# Patient Record
Sex: MALE | Race: WHITE | NOT HISPANIC OR LATINO | URBAN - METROPOLITAN AREA
[De-identification: names, ages, dates, MRNs, and addresses within clinical notes are randomized per-mention and may not be internally consistent; named-entity substitution may affect disease eponyms.]

---

## 2019-05-14 ENCOUNTER — OFFICE VISIT CONVERTED (OUTPATIENT)
Dept: PULMONOLOGY | Facility: CLINIC | Age: 54
End: 2019-05-14
Attending: PHYSICIAN ASSISTANT

## 2019-06-03 ENCOUNTER — HOSPITAL ENCOUNTER (OUTPATIENT)
Dept: CARDIOLOGY | Facility: HOSPITAL | Age: 54
Discharge: HOME | End: 2019-06-03
Attending: INTERNAL MEDICINE

## 2019-07-12 ENCOUNTER — HOSPITAL ENCOUNTER (OUTPATIENT)
Dept: GENERAL RADIOLOGY | Facility: HOSPITAL | Age: 54
Discharge: HOME | End: 2019-07-12
Attending: PHYSICIAN ASSISTANT

## 2019-07-30 ENCOUNTER — OFFICE VISIT CONVERTED (OUTPATIENT)
Dept: PULMONOLOGY | Facility: CLINIC | Age: 54
End: 2019-07-30
Attending: INTERNAL MEDICINE

## 2019-08-06 ENCOUNTER — HOSPITAL ENCOUNTER (OUTPATIENT)
Dept: OTHER | Facility: HOSPITAL | Age: 54
Discharge: HOME | End: 2019-08-06
Attending: INTERNAL MEDICINE

## 2019-10-18 ENCOUNTER — HOSPITAL ENCOUNTER (OUTPATIENT)
Dept: CARDIOLOGY | Facility: HOSPITAL | Age: 54
Discharge: HOME | End: 2019-10-18
Attending: INTERNAL MEDICINE

## 2019-12-17 ENCOUNTER — OFFICE VISIT CONVERTED (OUTPATIENT)
Dept: PULMONOLOGY | Facility: CLINIC | Age: 54
End: 2019-12-17
Attending: INTERNAL MEDICINE

## 2021-05-28 VITALS
HEART RATE: 89 BPM | OXYGEN SATURATION: 99 % | WEIGHT: 209.44 LBS | SYSTOLIC BLOOD PRESSURE: 132 MMHG | TEMPERATURE: 98.3 F | RESPIRATION RATE: 15 BRPM | HEIGHT: 68 IN | DIASTOLIC BLOOD PRESSURE: 80 MMHG | BODY MASS INDEX: 31.74 KG/M2

## 2021-05-28 VITALS
HEIGHT: 68 IN | SYSTOLIC BLOOD PRESSURE: 139 MMHG | BODY MASS INDEX: 31.9 KG/M2 | HEART RATE: 100 BPM | TEMPERATURE: 98.5 F | TEMPERATURE: 98.2 F | WEIGHT: 210.12 LBS | BODY MASS INDEX: 31.85 KG/M2 | HEIGHT: 68 IN | HEART RATE: 89 BPM | OXYGEN SATURATION: 98 % | WEIGHT: 210.5 LBS | RESPIRATION RATE: 12 BRPM | RESPIRATION RATE: 12 BRPM | OXYGEN SATURATION: 97 % | DIASTOLIC BLOOD PRESSURE: 71 MMHG | DIASTOLIC BLOOD PRESSURE: 60 MMHG | SYSTOLIC BLOOD PRESSURE: 106 MMHG

## 2021-05-28 NOTE — PROGRESS NOTES
Patient: SAIGE FERNANDO     Acct: [id number]     Report: #[id number]  UNIT #: [id number]     : 1965    Encounter Date:2019  PRIMARY CARE:   ***Signed***  --------------------------------------------------------------------------------------------------------------------  Chief Complaint      Encounter Date      Dec 17, 2019            Primary Care Provider      SACHIN MIRELES [place]            Referring Provider            [facility]            Patient Complaint      Patient is complaining of      4 month follow up/soa            VITALS      Height 5 ft 8 in / 172.72 cm      Weight 210 lbs 2 oz / 95.410881 kg      BSA 2.09 m2      BMI 31.9 kg/m2      Temperature 98.5 F / 36.94 C - Oral      Pulse 89      Respirations 12      Blood Pressure 139/60 Sitting, Right Arm      Pulse Oximetry 97%, roomair      Initial Exhaled Nitrous Oxide      Date:  2019      Exhaled Nitrous Oxide Results:  6            HPI      The patient is a very pleasant 54 year old  male former cigarettes     smoker with chronic obstructive pulmonary disease here for follow up today.             Since his last office visit he had pancytopenia and has been diagnosed with MDS.    He is being evaluated by [facility] and will likely undergo bone marrow transplant    sometime next year. He has finished pulmonary rehab. He wears 2 liters of     oxygen. He is doing well on his current nebulizer and inhaler regimen. He gets     short of breath walking 800-900 feet, moderate in severity, worse with exertion,    relieved with rest. He denies any coughing, wheezing, headaches and hemoptysis     or chest pain. He is able to perform his ADL's without difficulty and denies any    swollen glands in his lymph nodes, head or neck. He has not had a cigarette in 8    months.             I personally reviewed Review of Systems, family, social, surgical and medical     history and agree with their findings.             ROS      Constitutional:  Denies: Fatigue, Fever, Weight gain, Weight loss, Chills,     Insomnia, Other      Respiratory/Breathing:  Denies: Shortness of air, Wheezing, Cough, Hemoptysis,     Pleuritic pain, Other      Endocrine:  Denies: Polydipsia, Polyuria, Heat/cold intolerance, Diabetes, Other      Eyes:  Denies: Blurred vision, Vision Changes, Other      Ears, nose, mouth, throat:  Denies: Mouth lesions, Thrush, Throat pain,     Hoarseness, Allergies/Hay Fever, Post Nasal Drip, Headaches, Recent Head Injury,    Nose Bleeding, Neck Stiffness, Thyroid Mass, Hearing Loss, Ear Fullness, Dry     Mouth, Nasal or Sinus Pain, Dry Lips, Nasal discharge, Nasal congestion, Other      Cardiovascular:  Denies: Palpitations, Syncope, Claudication, Chest Pain, Wake     up Gasping for air, Leg Swelling, Irregular Heart Rate, Cyanosis, Dyspnea on     Exertion, Other      Gastrointestinal:  Denies: Nausea, Constipation, Diarrhea, Abdominal pain,     Vomiting, Difficulty Swallowing, Reflux/Heartburn, Dysphagia, Jaundice,     Bloating, Melena, Bloody stools, Other      Genitourinary:  Denies: Urinary frequency, Incontinence, Hematuria, Urgency,     Nocturia, Dysuria, Testicular problems, Other      Musculoskeletal:  Denies: Joint Pain, Joint Stiffness, Joint Swelling, Myalgias,    Other      Hematologic/lymphatic:  DENIES: Lymphadenopathy, Bruising, Bleeding tendencies,     Other      Neurological:  Denies: Headache, Numbness, Weakness, Seizures, Other      Psychiatric:  Denies: Anxiety, Appropriate Effect, Depression, Other      Sleep:  No: Excessive daytime sleep, Morning Headache?, Snoring, Insomnia?, Stop    breathing at sleep?, Other      Integumentary:  Denies: Rash, Dry skin, Skin Warm to Touch, Other      Immunologic/Allergic:  Denies: Latex allergy, Seasonal allergies, Asthma,     Urticaria, Eczema, Other      Immunization status:  No: Up to date            FAMILY/SOCIAL/MEDICAL HX      Surgical History:  Yes:  Cholecystectomy; No: AAA Repair, Abdominal Surgery,     Adenoids, Angioplasty, Appendectomy, Back Surgery, Bladder Surgery, Bowel     Surgery, Breast Surgery, CABG, Carotid Stenosis, Ear Surgery, Eye Surgery, Head     Surgery, Hernia Surgery, Kidney Surgery, Nose Surgery, Oral Surgery, Orthopedic     Surgery, Prostatectomy, Rectal Surgery, Spinal Surgery, Testicular Surgery,     Throat Surgery, Tonsils, Valve Replacement, Vascular Surgery, Other Surgeries      Other Family Medical History:  Father      Is Father Still Living?:  No      Is Mother Still Living?:  No       Family History:  Yes      Social History:  No Tobacco Use, No Alcohol Use, No Recreational Drug use      Smoking status:  Former smoker (smoker x40 years, quit in 4/2019)      Anticoagulation Therapy:  No      Antibiotic Prophylaxis:  No      Medical History:  Yes: Chronic Bronchitis/COPD, Congestive Heart Failu (newly     diagnosed), High Blood Pressure, Shortness Of Breath; No: Alcoholism, Allergies,    Anemia, Arthritis, Asthma, Atrial Fibrillation, Blood Disease, Broken Bones,     Cataracts, Chemical Dependency, Chemotherapy/Cancer, Emphysema, Chronic Liver     Disease, Colon Trouble, Colitis, Diverticulitis, Deafness or Ringing Ears,     Convulsions, Depression, Anxiety, Bipolar Disorder, PTSD, Diabetes, Epilepsy,     Seizures, Forgetfullness, Glaucoma, Gall Stones, Gout, Head Injury, Heart     Attack, Heart Murmur, GERD, Hemorrhoids/Rectal Prob, Hepatitis, Hiatal Hernia,     High Cholesterol, HIV (Do not ask - volu, Jaundice, Kidney or Bladder Disease,     Kidney Stones, Migrane Headaches, Mitral Valve Prolapse, Night sweats,     Phlebitis, Psychiatric Care, Reflux Disease, Rheumatic Fever, Sexually     Transmitted Dis, Sinus Trouble, Skin Disease/Psoriais/Ecz, Stroke, Thyroid     Problem, Tuberculosis or Pos TB Te, Miscellaneous Medical/oth      Psychiatric History      none            PREVENTION      Hx Influenza Vaccination:  No       Influenza Vaccine Declined:  Yes      2 or More Falls Past Year?:  No      Fall Past Year with Injury?:  No      Hx Pneumococcal Vaccination:  No      Encouraged to follow-up with:  PCP regarding preventative exams.      Chart initiated by      pau kirby ma            ALLERGIES/MEDICATIONS      Allergies:        Coded Allergies:             NO KNOWN ALLERGIES (Unverified , 12/17/19)      Medications    Last Reconciled on 12/17/19 16:27 by LAWRENCE BAUMANN MD      Tiotropium Br/Olodaterol HCl (Stiolto Respimat Inhal Spray) 4 Gm Mist.inhal               Prov: LAWRENCE BAUMANN         12/17/19       Promethazine Hcl (Phenergan*) 25 Mg Tablet      25 MG PO Q4H PRN for NAUSEA, TAB 0 Refills         Reported         12/17/19       Ondansetron Odt (ONDANSETRON ODT) 8 Mg Tab.rapdis      8 MG PO BID, TAB.RAPDIS 0 Refills         Reported         12/17/19       Lisinopril* (Lisinopril*) 20 Mg Tablet      20 MG PO QDAY, #30 TAB 0 Refills         Reported         12/17/19       Nicotine Polacrilex (Nicorette) 4 Mg Lozng.mini      4 MG PO Q2HR, TAB         Reported         12/17/19       Tiotropium Br/Olodaterol HCl (Stiolto Respimat Inhal Spray) 4 Gm Mist.inhal               Prov: Christine Delarosa PA-C         5/14/19       Tiotropium Br/Olodaterol HCl (Stiolto Respimat Inhal Spray) 4 Gm Mist.inhal      2 PUFFS INH RTQDAY, #1 INH 5 Refills         Prov: Christine Delarosa PA-C         5/14/19       (potassium)   No Conflict Check               Reported         4/27/19       MDI-Albuterol (Proair HFA) 8.5 Gm Hfa.aer.ad      2 PUFFS INH Q4H PRN for SHORTNESS OF BREATH, #1 MDI 0 Refills         Reported         4/27/19      Current Medications      Current Medications Reviewed 12/17/19            EXAM      Vital Signs Reviewed      Gen: WDWN, Alert, NAD.        HEENT:  PERRL, EOMI.  OP, nares clear, no sinus tenderness.      Chest: Poor aeration, barrel chested, trace bibasilar rhonchi, tympanic to     percussion bilaterally, no  work of breathing noted.      CV:  RRR, no MGR, pulses 2+, equal.      Abd:  Soft, NT, ND, + BS, no HSM.      EXT:  No clubbing, no cyanosis, no edema,.       Neuro:  A  Skin: No rashes or lesions.      Vtials      Vitals:             Height 5 ft 8 in / 172.72 cm           Weight 210 lbs 2 oz / 95.989683 kg           BSA 2.09 m2           BMI 31.9 kg/m2           Temperature 98.5 F / 36.94 C - Oral           Pulse 89           Respirations 12           Blood Pressure 139/60 Sitting, Right Arm           Pulse Oximetry 97%, roomair            REVIEW      Results Reviewed      PCCS Results Reviewed?:  Yes Prev Lab Results, Yes Prev Radiology Results, Yes     Previous Mec[facility] Records      Lab Results      I personally reviewed notes from Dr. Cornell Phipps at [facility] oncology. I     reviewed labs from May 2019 showing thrombocytopenia and evidence of chronic     hypercapneic respiratory failure.      Radiographic Results               [facility] Diagnostic Img                PACS RADIOLOGY REPORT            Patient: SAIGE FERNANDO   Acct: #[id number]   Report: #[id number]            UNIT #: [id number]    DOS: 19 1230      INSURANCE:[employer] - [facility]   ORDER #:[id number]      LOCATION:[facility]     : 1965            PROVIDERS      ADMITTING:     ATTENDING: Christine Delarosa PA-C      FAMILY:  EUNICEBRITNEY   ORDERING:  Christine Delarosa PA-C         OTHER:    DICTATING:  Cuong Goyal MD            REQ #:[id number]   [facility]      REASON FOR EXAM:        REASON FOR VISIT:  COPD            *******Signed******         PROCEDURE:   CT CHEST WITHOUT CONTRAST             COMPARISON:   [facility], CT, CHEST W/O CONTRAST, 2019, 20:5    1.             INDICATIONS:   ORDER STATES COPD, SOA, PATIENT STATES HX OF HISTOPLASMOSIS             TECHNIQUE:   CT images were created without the administration of contrast      material.               PROTOCOL:     Standard imaging protocol performed                RADIATION:     DLP: 516.2mGy*cm          Automated exposure control was utilized to minimize radiation dose.              FINDINGS:         There is vascular calcification including some Coronary artery calcification     more marked in the       area of the proximal right coronary artery.  No pathologic appearing mediastinal     lymphadenopathy is       confirmed.             On evaluation of lung windows there is some bibasilar atelectasis.  There are no     pleural effusions.             There is calcification underlying left hemidiaphragm that may relate to fat     necrosis.  It looks       like there is some hepatic steatosis.             There are some underlying degenerative changes involving the thoracic spine.             There is suggested gynecomastia.             CONCLUSION:         1. Bibasilar atelectasis.      2. Vascular calcification including coronary artery calcification.  A     cardiovascular assessment       could be obtained if not already carried out to reassess this patient.      3. Gynecomastia      4. Probable hepatic steatosis              HAYDE WEINBERG MD             Electronically Signed and Approved By: HAYDE WEINBERG MD on 2019 at 13:58                        Until signed, this is an unconfirmed preliminary report that may contain      errors and is subject to change.                                              KAMCR:      D:19 [id number]      PFT Results      Patient: SAIGE FERNANDO   Acct: #[id number]   Report: #[id number]            UNIT #: [id number]    DOS:       LOCATION:[facility]     : 1965            PROVIDERS      ADMITTING:     FAMILY:  WAGNER THALIA         OTHER:       DICTATING:  LAWRENCE BAUMANN MD            REASON FOR VISIT:  COPD/RESPITORY FAILURE            *******Signed******                                    [facility]  [employer]                            [place]  [phone]               __________________________________________________________________________             Patient Name:                   Attending Physician:      Alton Bolton M.D.             Patient Visit # MR #            Admit Date  Disch Date     Location      [id number]    [id number]      10/18/2019                 [facility]             Date of Birth      1965      __________________________________________________________________________      0821 - DIAGNOSTIC REPORT             PULMONARY FUNCTION TEST             DATE OF TEST:      10/18/19.             SPIROMETRY:      Severe airflow obstruction noted.      FEV1 1.74 liters/49% predicted.      No bronchodilator response noted.      Flow volume loop shows obstruction.             LUNG VOLUMES:      Both hyperinflation and air trapping present.             DIFFUSION CAPACITY:      Diffusion capacity is severely reduced at 35% predicted.             OVERALL IMPRESSION:      Severe airflow obstruction with no bronchodilator response noted.  Both      hyperinflation and air trapping present.  Diffusion capacity severely      reduced.  Compared to PFT's done in June 2019, there has been a significant      improvement in his FEV1 from 1.22 liters to 1.74 liters, 42% improvement;      significant improvement in FVC from 2.68 liters to 3.32 liters, 23%      improvement.  There has also been a 29% improvement in his diffusion capacity      from 7.61 to 9.89.  Overall, he has had a marked improvement in all aspects      of pulmonary function.             To be electronically signed in [place] [employer] [place]      [id number] LAWRENCE BAUMANN M.D.             AM:porfirio      D:  10/23/2019 12:07      T:  10/23/2019 13:00      #[id number]             Until signed, this is an unconfirmed preliminary report that may contain      errors and is subject to change.                    Until signed, this is an unconfirmed preliminary report that may contain      errors and is subject to change.                     <Electronically signed by LAWRENCE BAUMANN MD>                10/24/19 [id number]               LAWRENCE BAUMANN MD:TIGREM      D:10/23/19 4154            Assessment      Notes      New Medications      * Nicotine Polacrilex (Nicorette) 4 MG LOZNG.MINI: 4 MG PO Q2HR      * Lisinopril* 20 MG TABLET: 20 MG PO QDAY #30      * ONDANSETRON ODT 8 MG TAB.RAPDIS: 8 MG PO BID      * Promethazine Hcl (Phenergan*) 25 MG TABLET: 25 MG PO Q4H PRN NAUSEA      * Tiotropium Br/Olodaterol HCl (Stiolto Respimat Inhal Spray) 4 GM MIST.INHAL          Sample - Qty 2      Discontinued Medications      * Nicotine 21 Mg Patch 1 EACH PATCH.TD24: 21 MG TRANSDERM QDAY #1      IMPRESSION:      1. Very severe chronic obstructive pulmonary disease GOLD stage C with FEV1 32%     predicted. He is on Stiolto and chronic obstructive pulmonary disease assessment     test score is 8. He completed pulmonary rehab.       2.  Chronic dyspnea.      3. Tobacco abuse of cigarettes in remission.       4. Obesity with BMI 31.9.      5. Chronic hypoxic and hypercapneic respiratory failure.       6. Myelodysplasic syndrome.       7. Thrombocytopenia.             PLAN:       1. Continue Stiolto with albuterol as needed.       2. Continue trilogy machine nightly and with naps.       3. Continue 2 liters of oxygen during the day.       4. I applauded the patient for going to pulmonary rehab. Continue exercising.       5. I applauded the patient for continued smoking cessation.       6. He is up to date with flu vaccine and Prevnar, Pneumovax will be due as early     as July 2020.      7. It is okay from my perspective for oncology to do bone marrow transplant in     [place] next year.       8. Follow up with me in 4-6 months.            Patient Education      Patient  Education Provided:  COPD            Electronically signed by LAWRENCE BAMUANN  12/30/2019 08:03       Disclaimer: Converted document may not contain table formatting or lab diagrams. Please see [facility] for the authenticated document.

## 2021-05-28 NOTE — PROGRESS NOTES
Patient: SAIGE FERNANDO     Acct: [id number]     Report: #[id number]  UNIT #: [id number]     : 1965    Encounter Date:2019  PRIMARY CARE:   ***Signed***  --------------------------------------------------------------------------------------------------------------------  Chief Complaint      Encounter Date      May 14, 2019            Primary Care Provider      SACHIN MIRELES [place]            Referring Provider            [facility]            Patient Complaint      Patient is complaining of      New patient here today for [facility] follow up            VITALS      Height 5 ft 8 in / 172.72 cm      Weight 209 lbs 7 oz / 94.783025 kg      BSA 2.08 m2      BMI 31.8 kg/m2      Temperature 98.3 F / 36.83 C - Oral      Pulse 89      Respirations 15      Blood Pressure 132/80 Sitting, Left Arm      Pulse Oximetry 99%, nasal canula , 2 lpm            HPI      The patient is a very pleasant 54 year old white male recently seen by Dr. Goncalves, Dr. Espitia and Dr. Shaikh while hospitalized at [facility]. He was intubated and transferred from [facility] with a     chronic obstructive pulmonary disease exacerbation and also found ot have E.     Coli pneumonia. He had acute on chronic hypoxic and hypercapneic respiratory     failure, was extubated to BiPAP and was then set up for home trilogy. He has     also required home supplemental oxygen. The patient states he has been diagnosed    with chronic obstructive pulmonary disease for some time but is not sure of the     severity and has not had recent pulmonary function tests. He is maintained on     Stiolto which he feels is helping him significantly and says he has not needed     to use his albuterol rescue inhaler since he was discharged home. He denies any     coughing or wheezing, hemoptysis, fever or chills. His main complaint is that he    is more fatigued and does not have his strength or energy back yet  after his     recent hospitalization. He quit smoking during his hospitalization but is now     off of cigarettes altogether for about 3 weeks.             I reviewed her Review of Systems, medical, surgical and family history and agree    with those as entered.      Copies To:   LESLI MASON ;            ROZ      Constitutional:  Complains of: Fatigue; Denies: Fever, Weight gain, Weight loss,    Chills, Insomnia, Other      Respiratory/Breathing:  Denies: Shortness of air, Wheezing, Cough, Hemoptysis,     Pleuritic pain, Other      Endocrine:  Denies: Polydipsia, Polyuria, Heat/cold intolerance, Diabetes, Other      Eyes:  Denies: Blurred vision, Vision Changes, Other      Ears, nose, mouth, throat:  Denies: Mouth lesions, Thrush, Throat pain,     Hoarseness, Allergies/Hay Fever, Post Nasal Drip, Headaches, Recent Head Injury,    Nose Bleeding, Neck Stiffness, Thyroid Mass, Hearing Loss, Ear Fullness, Dry     Mouth, Nasal or Sinus Pain, Dry Lips, Nasal discharge, Nasal congestion, Other      Cardiovascular:  Denies: Palpitations, Syncope, Claudication, Chest Pain, Wake     up Gasping for air, Leg Swelling, Irregular Heart Rate, Cyanosis, Dyspnea on     Exertion, Other      Gastrointestinal:  Denies: Nausea, Constipation, Diarrhea, Abdominal pain,     Vomiting, Difficulty Swallowing, Reflux/Heartburn, Dysphagia, Jaundice,     Bloating, Melena, Bloody stools, Other      Genitourinary:  Denies: Urinary frequency, Incontinence, Hematuria, Urgency,     Nocturia, Dysuria, Testicular problems, Other      Musculoskeletal:  Denies: Joint Pain, Joint Stiffness, Joint Swelling, Myalgias,    Other      Hematologic/lymphatic:  DENIES: Lymphadenopathy, Bruising, Bleeding tendencies,     Other      Neurological:  Denies: Headache, Numbness, Weakness, Seizures, Other      Psychiatric:  Denies: Anxiety, Appropriate Effect, Depression, Other      Sleep:  No: Excessive daytime sleep, Morning Headache?, Snoring, Insomnia?, Stop     breathing at sleep?, Other      Integumentary:  Denies: Rash, Dry skin, Skin Warm to Touch, Other      Immunologic/Allergic:  Denies: Latex allergy, Seasonal allergies, Asthma,     Urticaria, Eczema, Other      Immunization status:  No: Up to date            FAMILY/SOCIAL/MEDICAL HX      Surgical History:  Yes: Cholecystectomy      Other Family Medical History:  Father      Is Father Still Living?:  No      Is Mother Still Living?:  No      Social History:  Tobacco Use      Smoking status:  Former smoker (smoker x40 years, quit in 4/2019)      Anticoagulation Therapy:  No      Antibiotic Prophylaxis:  No      Medical History:  Yes: Chronic Bronchitis/COPD, Congestive Heart Failu (newly     diagnosed), High Blood Pressure, Shortness Of Breath; No: Arthritis, Blood     Disease, Chemotherapy/Cancer, Deafness or Ringing Ears, Hemorrhoids/Rectal Prob,    Miscellaneous Medical/oth      Psychiatric History      none            PREVENTION      Hx Influenza Vaccination:  No      Influenza Vaccine Declined:  Yes      2 or More Falls Past Year?:  No      Fall Past Year with Injury?:  No      Hx Pneumococcal Vaccination:  No      Encouraged to follow-up with:  PCP regarding preventative exams.      Chart initiated by      Matilde Dang CMA            ALLERGIES/MEDICATIONS      Allergies:        Coded Allergies:             NO KNOWN ALLERGIES (Unverified , 5/14/19)      Medications    Last Reconciled on 5/14/19 11:33 by GIRISH BETTENCOURT      Tiotropium Br/Olodaterol HCl (Stiolto Respimat Inhal Spray) 4 Gm Mist.inhal      2 PUFFS INH RTQDAY, #1 INH 5 Refills         Prov: Christine Delarosa PA-C         5/14/19       Aspirin Chew (Aspirin Baby) 81 Mg Tab.chew      81 MG PO QDAY, #30 TAB.CHEW 0 Refills         Reported         5/14/19       Nicotine 21 Mg Patch (Nicotine 21 Mg Patch) 1 Each Patch.td24      21 MG TRANSDERM QDAY, #1 BOX         Prov: Sravani Moore         5/2/19       (potassium)   No Conflict Check                Reported         4/27/19       MDI-Albuterol (Proair HFA) 8.5 Gm Hfa.aer.ad      2 PUFFS INH Q4H PRN for SHORTNESS OF BREATH, #1 MDI 0 Refills         Reported         4/27/19       Lisinopril* (Lisinopril*) 40 Mg Tablet      40 MG PO QDAY, #30 TAB 0 Refills         Reported         4/27/19      Current Medications      Current Medications Reviewed 5/14/19            EXAM      VITAL SIGNS:  Reviewed.        NECK:  Supple without tracheal deviation or lymphadenopathy.  No thyromegaly     appreciated.      LYMPHATICS:  No cervical or supraclavicular lymphadenopathy.      HEENT: Pupils are equal, round and reactive to light. There is no scleral     icterus.  Nares patent without hypertrophy of the turbinates. No erythema of the     passages.  TMs are clear bilaterally with good cone of light. The posterior     pharynx is without  lesions or erythema.      RESPIRATORY: Mildly decreased breath sounds throughout, no wheezes, rhonchi or     crackles, normal work of breathing noted.        CARDIOVASCULAR:  Regular rate and rhythm.  No murmurs, gallops or rubs.  No     lower extremity edema.  Equal radial pulses.        GI: Soft, nontender, nondistended, no organomegaly.  Bowel sounds present in all     four quadrants.      MUSCULOSKELETAL:  No joint effusions, erythema or warmth over the major joint     systems.      SKIN:  No rashes or lesions.      NEUROLOGIC: Cranial nerves II-XII are intact bilaterally.  Moves all     extremities. Ambulates with ease.      PSYCH:  Appropriate mood and affect.      Vtials      Vitals:             Height 5 ft 8 in / 172.72 cm           Weight 209 lbs 7 oz / 94.358434 kg           BSA 2.08 m2           BMI 31.8 kg/m2           Temperature 98.3 F / 36.83 C - Oral           Pulse 89           Respirations 15           Blood Pressure 132/80 Sitting, Left Arm           Pulse Oximetry 99%, nasal canula , 2 lpm            REVIEW      Results Reviewed      PCCS Results Reviewed?:  Yes Prev Lab  Results, Yes Prev Radiology Results, Yes     Previous [facility] Records (I personally reviewed the patient's most recent     pulmonary consultation, progress notes and discharge summary.)      Radiographic Results               [facility]                PACS RADIOLOGY REPORT            Patient: SAIGE FERNANDO   Acct: #[id number]   Report: #[id number]            UNIT #: [id number]    DOS: 19      INSURANCE:[facility] ACCESS [facility]WORK - [facility]   ORDER #:[id number]      LOCATION:ICU  2   : 1965            PROVIDERS      ADMITTING:  Slim Kunz   ATTENDING: Slim Kunz      FAMILY:  NONE,MD   ORDERING:  Kathy Lee         OTHER:    DICTATING:  Shiv Flores MD, JR            REQ #:[id number]   EXAM:CHWO - CT CHEST without CONTRAST      REASON FOR EXAM:  acute hypoxemic respiratory failure      REASON FOR VISIT:  ACUTE COPD,ACUTE HYPOXIC RESPIRATORY FAILURE            *******Signed******         PROCEDURE:   CT CHEST WITHOUT CONTRAST             COMPARISON:   [facility], CR, CHEST AP/PA 1 VIEW, 2019,     19:05.  Other, CR, CXR       PORTABLE, 2019, 1:44.             INDICATIONS:   ACUTE HYPOXEMIC RESPIRATORY FAILURE.             TECHNIQUE:   CT images were created without the administration of contrast     material.               PROTOCOL:     Standard imaging protocol performed                RADIATION:     DLP: 655.57 mGy*cm          Automated exposure control was utilized to minimize radiation dose.              FINDINGS:   Motion artifact obscures detail on the study.  Mild subsegmental     atelectasis is seen       bilaterally, especially in the lung bases and in the dependent portions of the     lungs.  Probably no       acute infiltrate is identified.  No definite chronic interstitial lung disease     is seen.  Minimal       centrilobular and paraseptal emphysematous changes are seen, especially involvin    g  the lung apices.        No cardiac enlargement.  Calcified atherosclerotic change is present, including     involvement of the       coronary arteries.  There are suspected old healed left-sided rib fractures.      There is probable       chronic asymmetric elevation of the left diaphragm.  Two focal eggshell     calcifications are seen in       the subphrenic region and are nonspecific.  They may be related to old trauma.      They measure about       1.5 cm in greatest diameter.  They are not associated with the splenic arteries.      The patient has       undergone cholecystectomy.  There is mild lateral curvature of the thoracolumbar     spine with the       convexity to the right, which may be fixed or positional.  No enlarged     mediastinal lymph nodes are       suggested.  No pleural or pericardial effusion is seen.  Atrophy involving the     left kidney cannot       be excluded.  There is bilateral gynecomastia.  Degenerative changes involve the     imaged spine.  No       pneumothorax is seen.  No definite suspicious pulmonary nodules are appreciated.      However, the       motion artifact and suspected atelectasis may obscure such findings.             CONCLUSION:   Probably no acute infiltrate is seen.  The study is limited by     motion artifact.               COOKIE MACDONALD JR, MD             Electronically Signed and Approved By: COOKIE MACDONALD JR, MD on 4/27/2019 at     21:26                        Until signed, this is an unconfirmed preliminary report that may contain      errors and is subject to change.                                              CARJI:      D:04/27/19 2126            Assessment      COPD (chronic obstructive pulmonary disease) - J44.9            Notes      New Medications      * Aspirin Chew (Aspirin Baby) 81 MG TAB.CHEW: 81 MG PO QDAY #30      * Tiotropium Br/Olodaterol HCl (Stiolto Respimat Inhal [place]) 4 GM MIST.INHAL: 2       PUFFS INH RTQDAY #1         Dx: COPD (chronic  obstructive pulmonary disease) - J44.9      * Tiotropium Br/Olodaterol HCl (Stiolto Respimat Inhal Spray) 4 GM MIST.INHAL          Sample - Qty 2      Discontinued Medications      * predniSONE* 20 MG TABLET: 40 MG PO QDAY 1 Day #2      * CEFDINIR 300 MG CAP: 300 MG PO BID 1 Day #2      New Diagnostics      * Chest W/O Cont CT, 2 Months         Dx: COPD (chronic obstructive pulmonary disease) - J44.9      ASSESSMENT:      1. Chronic obstructive pulmonary disease severity currently unknown with recent     acute exacerbation.       2. Acute on chronic hypoxic and hypercapneic respiratory failure requiring     mechanical ventilation resolving.       3. Recent E. Coli pneumonia resolving clinically.       4. Longstanding tobacco abuse of cigarettes in remission for 3 weeks.       5. Dyspnea.       6. Obesity with BMI 31.8.            PLAN:      1. At this time I have discussed with the patient the importance of wearing his     supplemental oxygen to keep oxygen saturation above 90% and using his trilogy     machine every night. He did receive this and initially needed to have his     settings adjusted but now that the pressures and alarms have been adjusted, he     is tolerating it very well. I have instructed him to continue using this every     night with sleep, with naps and as needed during the day.       2. Continue Stiolto 2 puffs once daily and albuterol as needed. I have given him     samples of Stiolto at his request today.       3. He is scheduled for outpatient pulmonary function tests in 3 weeks and he     should get those done as ordered. We will review these at his next visit.       4. After discussing with the patient about alpha 1 antitrypsin testing,  he     states his primary care provider in [place] just recently had those done in     her office. We will request the results be sent to us       5. I will repeat a CT scan of the chest in 2 months to ensure resolution of his     E. Coli pneumonia.        6. I have congratulated him on recently quitting smoking. Smoking cessation was     discussed for 3 minutes today. I offered him medication therapy or nicotine     replacement therapy which he declines as he had nicotine lozenges at home that     he feels are helping him.       7. Follow up in 2 months with Dr. Goncalves or Dr. Espitia to discuss test results     and see how he is doing, sooner if needed.            Patient Education      Tobacco Cessation Counseling:  for 3 - 10 minutes      Patient Education Provided:  COPD, How to use an Inhaler, Smoking Cessation      Time Spent:  > 50% /[facility]            Patient Education:        Chronic Obstructive Pulmonary Disease                 Disclaimer: Converted document may not contain table formatting or lab diagrams. Please see [employer] for the authenticated document.

## 2021-05-28 NOTE — PROGRESS NOTES
Patient: SAIGE FERNANDO     Acct: [id number]     Report: #[id number]  UNIT #: [id number]     : 1965    Encounter Date:2019  PRIMARY CARE:   ***Signed***  --------------------------------------------------------------------------------------------------------------------  Chief Complaint      Encounter Date      2019            Primary Care Provider      SACHIN MIRELES [place]            Referring Provider            [facility]            Patient Complaint      Patient is complaining of      f/u atelectasis            VITALS      Height 5 ft 8.00 in / 172.72 cm      Weight 210 lbs 8.000 oz / 95.4812 kg      BSA 2.09 m2      BMI 32.0 kg/m2      Temperature 98.2 F / 36.78 C - Oral      Pulse 100      Respirations 12      Blood Pressure 106/71 Sitting, Right Arm      Pulse Oximetry 98%, roomair      Initial Exhaled Nitrous Oxide      Date:  2019      Exhaled Nitrous Oxide Results:  6            HPI      The patient is a very pleasant 54 year old  male former cigarettes     smoker with chronic obstructive pulmonary disease here for follow up.             Since his last office visit CT scan of the chest shows severe emphysema with     resolution of pneumonia. Pulmonary function tests were done showing FEV1 of 32%     predicted consistent with very severe chronic obstructive pulmonary disease. He     has a trilogy machine that he wears nightly and he is on Stiolto. He gets very     breathless after doing about 2 minutes of activity. He has issues doing things     around the house like walking around the house or going outside in his garden.     He has occasional cough that is dry with no sputum production or hemoptysis. He     denies any wheezing, chest pain, nausea or vomiting, fever or chills or     headaches.  He wears 2 liters of oxygen at night. During the day he does     desaturates but stops and rests and he bounces back up.  He has not had a      cigarette for about 3 months.   He is able to perform his ADL's without     difficulty and denies any swollen glands in his lymph nodes, head or neck.            I personally reviewed Review of Systems, family, social, surgical and medical hi    story and agree with their findings.            ROS      Constitutional:  Denies: Fatigue, Fever, Weight gain, Weight loss, Chills,     Insomnia, Other      Respiratory/Breathing:  Denies: Shortness of air, Wheezing, Cough, Hemoptysis,     Pleuritic pain, Other      Endocrine:  Denies: Polydipsia, Polyuria, Heat/cold intolerance, Diabetes, Other      Eyes:  Denies: Blurred vision, Vision Changes, Other      Ears, nose, mouth, throat:  Denies: Mouth lesions, Thrush, Throat pain,     Hoarseness, Allergies/Hay Fever, Post Nasal Drip, Headaches, Recent Head Injury,    Nose Bleeding, Neck Stiffness, Thyroid Mass, Hearing Loss, Ear Fullness, Dry     Mouth, Nasal or Sinus Pain, Dry Lips, Nasal discharge, Nasal congestion, Other      Cardiovascular:  Denies: Palpitations, Syncope, Claudication, Chest Pain, Wake     up Gasping for air, Leg Swelling, Irregular Heart Rate, Cyanosis, Dyspnea on     Exertion, Other      Gastrointestinal:  Denies: Nausea, Constipation, Diarrhea, Abdominal pain,     Vomiting, Difficulty Swallowing, Reflux/Heartburn, Dysphagia, Jaundice,     Bloating, Melena, Bloody stools, Other      Genitourinary:  Denies: Urinary frequency, Incontinence, Hematuria, Urgency,     Nocturia, Dysuria, Testicular problems, Other      Musculoskeletal:  Denies: Joint Pain, Joint Stiffness, Joint Swelling, Myalgias,    Other      Hematologic/lymphatic:  DENIES: Lymphadenopathy, Bruising, Bleeding tendencies,     Other      Neurological:  Denies: Headache, Numbness, Weakness, Seizures, Other      Psychiatric:  Denies: Anxiety, Appropriate Effect, Depression, Other      Sleep:  No: Excessive daytime sleep, Morning Headache?, Snoring, Insomnia?, Stop    breathing at sleep?, Other       Integumentary:  Denies: Rash, Dry skin, Skin Warm to Touch, Other      Immunologic/Allergic:  Denies: Latex allergy, Seasonal allergies, Asthma, Urti    caria, Eczema, Other      Immunization status:  No: Up to date            FAMILY/SOCIAL/MEDICAL HX      Surgical History:  Yes: Cholecystectomy      Other Family Medical History:  Father      Is Father Still Living?:  No      Is Mother Still Living?:  No       Family History:  Yes      Social History:  No Tobacco Use, No Alcohol Use, No Recreational Drug use      Smoking status:  Former smoker (smoker x40 years, quit in 4/2019)      Anticoagulation Therapy:  No      Antibiotic Prophylaxis:  No      Medical History:  Yes: Chronic Bronchitis/COPD, Congestive Heart Failu (newly     diagnosed), High Blood Pressure, Shortness Of Breath; No: Arthritis, Blood     Disease, Chemotherapy/Cancer, Deafness or Ringing Ears, Hemorrhoids/Rectal Prob,    Sinus Trouble, Miscellaneous Medical/oth      Psychiatric History      none            PREVENTION      Hx Influenza Vaccination:  No      Influenza Vaccine Declined:  Yes      2 or More Falls Past Year?:  No      Fall Past Year with Injury?:  No      Hx Pneumococcal Vaccination:  No      Encouraged to follow-up with:  PCP regarding preventative exams.      Chart initiated by      flex/ ma            ALLERGIES/MEDICATIONS      Allergies:        Coded Allergies:             NO KNOWN ALLERGIES (Unverified , 7/30/19)      Medications    Last Reconciled on 7/30/19 14:01 by LAWRENCE BAUMANN MD      Tiotropium Br/Olodaterol HCl (Stiolto Respimat Inhal Spray) 4 Gm Mist.inhal               Prov: Christine Delarosa PA-C         5/14/19       Tiotropium Br/Olodaterol HCl (Stiolto Respimat Inhal Spray) 4 Gm Mist.inhal      2 PUFFS INH RTQDAY, #1 INH 5 Refills         Prov: Christine Delarosa PA-C         5/14/19       Aspirin Chew (Aspirin Baby) 81 Mg Tab.chew      81 MG PO QDAY, #30 TAB.CHEW 0 Refills         Reported         5/14/19        Nicotine 21 Mg Patch (Nicotine 21 Mg Patch) 1 Each Patch.td24      21 MG TRANSDERM QDAY, #1 BOX         Prov: Sravani Moore         5/2/19       (potassium)   No Conflict Check               Reported         4/27/19       MDI-Albuterol (Proair HFA) 8.5 Gm Hfa.aer.ad      2 PUFFS INH Q4H PRN for SHORTNESS OF BREATH, #1 MDI 0 Refills         Reported         4/27/19       Lisinopril* (Lisinopril*) 40 Mg Tablet      40 MG PO QDAY, #30 TAB 0 Refills         Reported         4/27/19      Current Medications      Current Medications Reviewed 7/30/19            EXAM      Vital Signs Reviewed      Gen: WDWN, Alert, NAD.        HEENT:  PERRL, EOMI.  OP, nares clear, no sinus tenderness.      Chest: Poor aeration, barrel chested, trace bibasilar rhonchi, tympanic to     percussion bilaterally, no work of breathing noted.      CV:  RRR, no MGR, pulses 2+, equal.      Abd:  Soft, NT, ND, + BS, no HSM.      EXT:  No clubbing, no cyanosis, no edema,.       Neuro:  A  Skin: No rashes or lesions.      Vtials      Vitals:             Height 5 ft 8.00 in / 172.72 cm           Weight 210 lbs 8.000 oz / 95.4812 kg           BSA 2.09 m2           BMI 32.0 kg/m2           Temperature 98.2 F / 36.78 C - Oral           Pulse 100           Respirations 12           Blood Pressure 106/71 Sitting, Right Arm           Pulse Oximetry 98%, roomair            REVIEW      Results Reviewed      PCCS Results Reviewed?:  Yes Prev Lab Results, Yes Prev Radiology Results, Yes     [facility]      Lab Results      I personally reviewed the patient's alpha 1 antitrypsin testing and it was     normal genotype MM.      Radiographic Results               [facility] Diagnostic Img                PACS RADIOLOGY REPORT            Patient: SAIGE FERNANDO   Acct: #[id number]   Report: #[id number]            UNIT #: [id number]    DOS: 07/12/19 1230      INSURANCE:[employer] - P[employer]   ORDER  #:CT [id number]      LOCATION:[facility]     : 1965            PROVIDERS      ADMITTING:     ATTENDING: Christine Delarosa PA-C      FAMILY:  EUNICE,BRITNEY   ORDERING:  Christine Delarosa PA-C         OTHER:    DICTATING:  Cuong Weinberg MD            [facility] #:[id number]   [facility]      REASON FOR EXAM:        REASON FOR VISIT:  COPD            *******Signed******         PROCEDURE:   CT CHEST WITHOUT CONTRAST             COMPARISON:   [facility], CT, CHEST W/O CONTRAST, 2019,     20:51.             INDICATIONS:   ORDER STATES COPD, SOA, PATIENT STATES HX OF HISTOPLASMOSIS             TECHNIQUE:   CT images were created without the administration of contrast     material.               PROTOCOL:     Standard imaging protocol performed                RADIATION:     DLP: 516.2mGy*cm          Automated exposure control was utilized to minimize radiation dose.              FINDINGS:         There is vascular calcification including some Coronary artery calcification     more marked in the       area of the proximal right coronary artery.  No pathologic appearing mediastinal     lymphadenopathy is       confirmed.             On evaluation of lung windows there is some bibasilar atelectasis.  There are no     pleural effusions.             There is calcification underlying left hemidiaphragm that may relate to fat     necrosis.  It looks       like there is some hepatic steatosis.             There are some underlying degenerative changes involving the thoracic spine.             There is suggested gynecomastia.             CONCLUSION:         1. Bibasilar atelectasis.      2. Vascular calcification including coronary artery calcification.  A     cardiovascular assessment       could be obtained if not already carried out to reassess this patient.      3. Gynecomastia      4. Probable hepatic steatosis              CUONG WEINBERG MD             Electronically Signed and Approved By:  HAYDE WEINBERG MD on 2019 at 13:58                        Until signed, this is an unconfirmed preliminary report that may contain      errors and is subject to change.                                              KAMCR:      D:19 [id number]      PFT Results      Patient: ALTON BOLTON   Acct: #[id number]   Report: #[id number]            UNIT #: [id number]    DOS:       LOCATION:[facility]     : 1965            PROVIDERS      ADMITTING:     FAMILY:  MD NONE         OTHER:       DICTATING:  LESLI MASON DO            REASON FOR VISIT:  COPD            *******Signed******                                    [facility]                          [facility] [employer] Services                            [place], [place]  [address]               __________________________________________________________________________             Patient Name:                   Attending Physician:      Alton Bolton D.O.             Patient Visit # MR #            Admit Date  Disch Date     Location      [id number]    [id number]      2019                 [facility]- -             Date of Birth      1965      __________________________________________________________________________      0821 - DIAGNOSTIC REPORT             PULMONARY FUNCTION TEST             DATE OF SERVICE:      2019.             SPIROMETRY:      FEV1/FVC ratio is 42%.      FEV1 is 32% of predicted, 1.13 L.      FVC is 59% of predicted, 2.72 L.      No one-time response to bronchodilator administration.             LUNG VOLUMES:      Total lung capacity is 129% of predicted, 8.57 L.      Residual volume is 242% of predicted, 5.00 L.             DIFFUSION CAPACITY:      DLCO is 26% of predicted.             FLOW VOLUME LOOP:      Flow volume loops show severe obstructive defect.             CONCLUSION:      1. Abnormal spirometry with severe obstructive defect.  There was no one-time           response to bronchodilator administration.      2. Lung volumes show air trapping and hyperinflation.      3. Diffusion capacity is severely reduced.      4. Please correlate clinically.             To be electronically signed in [facility]      [id number] LESLI MASON D.O.             KM:rt      D:  06/05/2019 14:34      T:  06/05/2019 14:40      #[id number]             Until signed, this is an unconfirmed preliminary report that may contain      errors and is subject to change.                   Until signed, this is an unconfirmed preliminary report that may contain      errors and is subject to change.                     <Electronically signed by LESLI MASON DO>                06/05/19 1454               LESLI MASON DO                                                                  MATNEO:BRIAN      D:06/05/19 1434            Assessment      COPD, severe - J44.9            Notes      New Office Procedures      * Prevnar-13, As Soon As Possible         Pneumoc 13-Lorena Conj-Dip CRm/Pf (Prevnar 13 Syringe) 0.5 ML SYRINGE: 0.5        MILLILITER INTRAMUSCULARLY Qty 1 SYRINGE         Dx: COPD, severe - J44.9      IMPRESSION:      1. Very severe chronic obstructive pulmonary disease GOLD stage C FEV1 32%     predicted and not a frequent exacerbator, doing well on Stiolto therapy. Chronic     obstructive pulmonary disease assessment tests score is 10 today. He would     benefit from pulmonary rehab.       2. Chronic dyspnea on exertion.       3. Tobacco abuse of cigarettes in remission.      4. Obesity with BMI 32.0.      5. Chronic hypoxic and hypercapneic respiratory failure.             PLAN:      1. Continue Stiolto with albuterol as needed.       2. Continue trilogy nightly and with naps.       3. Continue 2 liters of oxygen as needed.       4. Refer the patient to pulmonary rehab.       5. I applauded the patient for continued smoking cessation.       6. He is up to date with flu vaccine. I will give him prevnar  today followed by     pneumovax as early as July 2020.      7. We will have the patient follow up with me in 3-4 months to reassess symptoms     and discuss test results.            Patient Education      Patient Education Provided:  COPD                 Disclaimer: Converted document may not contain table formatting or lab diagrams. Please see [employer] for the authenticated document.